# Patient Record
Sex: FEMALE | Race: WHITE
[De-identification: names, ages, dates, MRNs, and addresses within clinical notes are randomized per-mention and may not be internally consistent; named-entity substitution may affect disease eponyms.]

---

## 2017-07-09 ENCOUNTER — HOSPITAL ENCOUNTER (EMERGENCY)
Dept: HOSPITAL 17 - NEPD | Age: 72
Discharge: HOME | End: 2017-07-09
Payer: MEDICARE

## 2017-07-09 VITALS
RESPIRATION RATE: 17 BRPM | OXYGEN SATURATION: 98 % | HEART RATE: 79 BPM | TEMPERATURE: 98.3 F | SYSTOLIC BLOOD PRESSURE: 140 MMHG | DIASTOLIC BLOOD PRESSURE: 66 MMHG

## 2017-07-09 DIAGNOSIS — E11.40: ICD-10-CM

## 2017-07-09 DIAGNOSIS — J44.9: ICD-10-CM

## 2017-07-09 DIAGNOSIS — J45.909: ICD-10-CM

## 2017-07-09 DIAGNOSIS — K21.9: ICD-10-CM

## 2017-07-09 DIAGNOSIS — I25.2: ICD-10-CM

## 2017-07-09 DIAGNOSIS — I25.10: ICD-10-CM

## 2017-07-09 DIAGNOSIS — M19.90: ICD-10-CM

## 2017-07-09 DIAGNOSIS — M54.32: Primary | ICD-10-CM

## 2017-07-09 DIAGNOSIS — I10: ICD-10-CM

## 2017-07-09 PROCEDURE — 96372 THER/PROPH/DIAG INJ SC/IM: CPT

## 2017-07-09 PROCEDURE — 99284 EMERGENCY DEPT VISIT MOD MDM: CPT

## 2017-07-09 NOTE — PD
HPI


Chief Complaint:  Pain: Acute or Chronic


Time Seen by Provider:  12:51


Travel History


International Travel<30 days:  No


Contact w/Intl Traveler<30days:  No


Traveled to known affect area:  No





History of Present Illness


HPI


The patient was seen and examined in the presence of the nurse.  This patient 

complains of pain in the left buttock that radiates down the back of her leg to 

the level of the mid calf.  She's had sciatica for the last 7 years but usually 

doesn't radiate this far.  No injury.  The duration of increased radiation is 2 

days.  She does go to pain management and get epidural injections.  No 

alleviating factors.  No bowel bladder incontinence or retention.  Symptoms 

severity is moderate.





PFSH


Past Medical History


Hx Anticoagulant Therapy:  Yes


Arthritis:  Yes


Asthma:  Yes


Blood Disorders:  No


Anxiety:  Yes


Depression:  Yes


Heart Rhythm Problems:  No


Cancer:  No


Cardiac Catheterization:  Yes


Cardiovascular Problems:  Yes


High Cholesterol:  Yes


Chest Pain:  Yes


Congestive Heart Failure:  No


COPD:  Yes


Coronary Artery Disease:  Yes


Diabetes:  Yes


Diminished Hearing:  Yes (LEFT EAR [place])


Endocrine:  Yes


Gastrointestinal Disorders:  Yes (GERD)


GERD:  Yes


Glaucoma:  No


Genitourinary:  No


Hepatitis:  No


Hiatal Hernia:  No


Hypertension:  Yes


Immune Disorder:  No


Implanted Vascular Access Dvce:  Yes


Musculoskeletal:  Yes (ARTHRITIS, NECK/ BACK PAIN)


Neurologic:  Yes (NEUROPATHY LEGS)


Psychiatric:  Yes (ANXIETY)


Reproductive:  No


Respiratory:  Yes (asthma, copd, bronchitis)


Integumentary:  No


Immunizations Current:  Yes


Myocardial Infarction:  Yes


Seizures:  No


Sleep Apnea:  No


Thyroid Disease:  Yes (THYROIDECTOMY)


Menopausal:  Yes





Past Surgical History


Abdominal Surgery:  No


AICD:  Yes ([employer] [phone] MODEL #'S [id number], [id number])


Body Medical Devices:  CARDIAC STENTS


Cardiac Surgery:  Yes


Coronary Stent:  Yes (X 2)


Ear Surgery:  No


Endocrine Surgery:  Yes (THYROIDECTOMY)


Eye Surgery:  Yes (LEFT CATARACT EXTRACT.)


Gynecologic Surgery:  No


Joint Replacement:  Yes (BILAT KNEES)


Neurologic Surgery:  No


Oral Surgery:  Yes (DENTAL, TONSILLECTOMY)


Pacemaker:  Yes (PLACED MAY 2016)


Thoracic Surgery:  No


Tonsillectomy:  Yes


Other Surgery:  Yes (BILAT KNEE)





Social History


Alcohol Use:  No


Tobacco Use:  No (FORMER)


Substance Use:  No





Allergies-Medications


(Allergen,Severity, Reaction):  


Coded Allergies:  


     Fabric Softeners (Unverified  Allergy, Severe, HIVES, DYSPNEA, 7/9/17)


     Lidocaine (Verified  Allergy, Severe, 7/9/17)


 HIVES


     Shellfish (Verified  Allergy, Severe, "stop breathing", 7/9/17)


     Cipro (Verified  Allergy, Mild, 7/9/17)


 HIVES


     Penicillin (Verified  Allergy, Mild, Hives, 7/9/17)


 HAS HAD ROCEPHIN IN PAST


     Talwin (Verified  Allergy, Mild, 7/9/17)


 HALLUCINATIONS


     MRI PRECAUTION (Verified  Adverse Reaction, Severe, [employer] EVARA DEFIB 

- MRI [id number] KMD 7-12-16, 7/9/17)


 [employer] EVARA DEFIB


 MODEL# [id number]


 LEAD MODEL# [id number]


 


 IMPLANTED 5/16/16


Reported Meds & Prescriptions





Reported Meds & Active Scripts


Active


Nebulizer (Miscellaneous Medication)  Mis 1 Box INH AS DIRECTED


Atrovent Ud 0.02% (0.5 Mg/2.5 Ml) (Ipratropium Bromide) 0.5 Mg/2.5 Ml Nebu 0.5 

Mg NEB Q6HR NEB 30 Days


     use with albuterol


     


Resp: Albuterol  2.5 Mg/3 Ml Neb (Albuterol Sulfate) 2.5 Mg/3 Ml Nebu 2.5 Mg 

INH Q6H PRN 30 Days


     take with atrovent


Coumadin (Warfarin Sod) 5 Mg Tab 5 Mg PO TUTHSA@16 30 Days


Ventolin Hfa (Albuterol Sulfate) 18 Gm Aero 2 Puff INH Q4H PRN 31 Days


     * SHAKE WELL BEFORE USE *


Reported


Xanax 0.25 Mg (Alprazolam) Alprazolam 0.25 mg Tab 1 Tab PO BID


Lioresal (Baclofen) 20 Mg Tab 20 Mg PO TID


Norco 7.5-325 mg (Hydrocodone-Acetaminophen 7.5-325 mg) 1 Tab 1 Tab PO TID PRN


Glimepiride 2 Mg Tab 2 Mg PO DAILY


Vitamin D 400 (Cholecalciferol) 400 Mg Chw 400 Mg CHEW BID


Losartan Potassium 25 MG (Losartan Potassium) 25 Mg Tab 25 Mg PO DAILY


Glucophage 500 mg (Metformin HCl) 500 Mg Tab 500 Mg PO DAILY


Cyclobenzaprine Hcl (Cyclobenzaprine HCl) 10 Mg Tab 10 Mg PO TID


Colace 100 Mg Cap (Docusate Sodium) 100 Mg Cap 100 Mg PO BID


Calcium 600 (Calcium Carbonate)  Tab 1 Tab PO HS


Dulera 100 mcg/dose (Mometasone Furoate-Formoterol 100 mcg/dose) 100 mcg/

actuation Inh 2 Puff PO BID


Protonix (Pantoprazole Sodium) 40 Mg Tab 40 Mg PO BID


Levothyroxine 100 mcg (Levothyroxine Sodium) 100 Mcg Tab 100 Mcg PO DAILY


Lovastatin 40 Mg Tab 20 Mg PO HS


Plavix (Clopidogrel Bisulfate) 75 Mg Tab 75 Mg PO DAILY


Prozac (Fluoxetine HCl) 20 Mg Cap 20 Mg PO DAILY


Coreg 12.5 mg (Carvedilol) 12.5 Mg Tab 6.25 Mg PO BID


Bupropion Hcl Xl (Bupropion HCl) 150 Mg Tab 150 Mg PO DAILY


Norvasc (Amlodipine Besylate) 10 Mg Tab 10 Mg PO DAILY


Zyrtec (Cetirizine HCl) 10 Mg Tab 10 Mg PO HS








Review of Systems


General / Constitutional:  No: Fever


Eyes:  No: Visual changes


HENT:  No: Headaches


Cardiovascular:  No: Chest Pain or Discomfort


Respiratory:  No: Shortness of Breath


Gastrointestinal:  No: Abdominal Pain


Genitourinary:  No: Dysuria


Musculoskeletal:  Positive: Pain


Skin:  No Rash


Neurologic:  No: Weakness


Psychiatric:  No: Depression


Endocrine:  No: Polydipsia


Hematologic/Lymphatic:  No: Easy Bruising





Physical Exam


Narrative


GASTROINTESTINAL:  Abdomen soft, non-tender, nondistended. Positive bowel 

sounds.  No hepato-splenomegaly, or palpable masses. No guarding.





NEUROLOGICAL: Awake and alert. Pupils are equal round and reactive. Motor and 

sensory grossly within normal limits. Five out of 5 muscle strength in all 

muscle groups.  Normal speech.





Positive straight leg raise in the left





Left leg shows no erythema or swelling or warmth.





Has tenderness with palpation of left buttock





Data


Data


Last Documented VS





Vital Signs








  Date Time  Temp Pulse Resp B/P Pulse Ox O2 Delivery O2 Flow Rate FiO2


 


7/9/17 12:37 98.3 79 17 140/66 98   








Orders








 Morphine Inj (Morphine Inj) (7/9/17 13:15)


Ondansetron Inj (Zofran Inj) (7/9/17 13:15)











MDM


Medical Decision Making


Medical Screen Exam Complete:  Yes


Emergency Medical Condition:  Yes


Medical Record Reviewed:  Yes


Differential Diagnosis


Sciatica, disc herniation, hamstring tear


Narrative Course


I have reviewed the patient's electronic medical record.





Patient is neurologically intact.  She is having a flare of her chronic sciatica





I gave her injection of morphine and Zofran for symptom relief





She will follow-up with her primary care and pain management





Diagnosis





 Primary Impression:  


 Sciatic leg pain





***Additional Instructions:


The patient was advised to follow up with their physician and return if they 

worsen.


***Med/Other Pt SpecificInfo:  Other


Disposition:  01 DISCHARGE HOME


Condition:  Stable








Hector Foster MD Jul 9, 2017 12:59

## 2018-05-16 ENCOUNTER — HOSPITAL ENCOUNTER (EMERGENCY)
Dept: HOSPITAL 17 - PHEFT | Age: 73
Discharge: HOME | End: 2018-05-16
Payer: MEDICARE

## 2018-05-16 VITALS
OXYGEN SATURATION: 95 % | SYSTOLIC BLOOD PRESSURE: 169 MMHG | HEART RATE: 62 BPM | RESPIRATION RATE: 16 BRPM | DIASTOLIC BLOOD PRESSURE: 76 MMHG | TEMPERATURE: 98.4 F

## 2018-05-16 VITALS — HEIGHT: 61 IN | BODY MASS INDEX: 37.46 KG/M2 | WEIGHT: 198.42 LBS

## 2018-05-16 DIAGNOSIS — M77.8: ICD-10-CM
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
## 2018-05-16 DIAGNOSIS — F32.9: ICD-10-CM

## 2018-05-16 DIAGNOSIS — J44.9: ICD-10-CM

## 2018-05-16 DIAGNOSIS — E11.9: ICD-10-CM

## 2018-05-16 DIAGNOSIS — M19.90: ICD-10-CM

## 2018-05-16 DIAGNOSIS — S51.011A: Primary | ICD-10-CM

## 2018-05-16 DIAGNOSIS — F41.9: ICD-10-CM

## 2018-05-16 DIAGNOSIS — I10: ICD-10-CM

## 2018-05-16 PROCEDURE — 73080 X-RAY EXAM OF ELBOW: CPT

## 2018-05-16 PROCEDURE — 12002 RPR S/N/AX/GEN/TRNK2.6-7.5CM: CPT

## 2018-05-16 PROCEDURE — 96372 THER/PROPH/DIAG INJ SC/IM: CPT

## 2018-05-16 PROCEDURE — 99283 EMERGENCY DEPT VISIT LOW MDM: CPT

## 2018-05-16 NOTE — RADRPT
EXAM DATE/TIME:  05/16/2018 17:35 

 

[facility] COMPARISON:     

No previous studies available for comparison.

 

                     

INDICATIONS :     

Right elbow pain, laceration post fall today

                     

 

MEDICAL HISTORY :     

None.          

 

SURGICAL HISTORY :     

None.   

 

ENCOUNTER:     

Initial                                        

 

ACUITY:     

1 day      

 

PAIN SCORE:     

5/10

 

LOCATION:     

Right  posterior elbow

 

FINDINGS:     

Multiple view examination of the right elbow demonstrates no soft tissue swelling, joint effusion, or
 definite acute fracture.  There is a small bony density seen adjacent to the distal lateral aspect o
f the humerus. This is likely related to hypertrophic change. A donor site to suggest fracture is not
 seen. There is minimal spurring at the olecranon. The osseous structures are in normal alignment.  B
bharathi mineralization is normal.

 

CONCLUSION:     

1. No definite acute fracture or effusion is seen.

2. Small focal bony density likely related to hypertrophic change adjacent to the distal lateral aspe
ct of the humerus.

3. Small olecranon spur.

 

 

 

 

 Jimbo Reyes MD on May 16, 2018 at 18:11           

Board Certified Radiologist.

 This report was verified electronically.

## 2018-05-16 NOTE — PD
HPI


Chief Complaint:  Laceration/Skin Injury


Time Seen by Provider:  17:21


Travel History


International Travel<30 days:  No


Contact w/Intl Traveler<30days:  No


Traveled to known affect area:  No





History of Present Illness


HPI


72-year-old female that presents to the ED for evaluation of injury to her 

right elbow.  Patient reports that she accidentally lost her balance while 

walking into her garage door.  She cut herself on the garage door.  She did not 

hit her head or lose consciousness.  Denies any back or neck pain.  All her 

pain is in the right elbow.  She is able to move it fully.  Denies any numbness

, drooling, weakness.  No chest pain or shortness of breath.  No urinary or 

bowel movement issues.  Pain per patient is 6 out of 10 especially of the right 

elbow where she has a laceration of about 5 cm.  Able to move the elbow fully.  

No prior injuries to this area.  She does have multiple allergies to different 

medications including lidocaine.  She has not taken anything for this.  Injury 

occurred less than 2 hours ago.





PFSH


Past Medical History


Hx Anticoagulant Therapy:  Yes


Arthritis:  Yes


Asthma:  Yes


Blood Disorders:  No


Anxiety:  Yes


Depression:  Yes


Heart Rhythm Problems:  No


Cancer:  No


Cardiac Catheterization:  Yes


Cardiovascular Problems:  Yes


High Cholesterol:  Yes


Chest Pain:  Yes


Congestive Heart Failure:  No


COPD:  Yes


Coronary Artery Disease:  Yes


Diabetes:  Yes


Patient Takes Glucophage:  No


Diminished Hearing:  Yes (LEFT EAR [place])


Endocrine:  Yes


Gastrointestinal Disorders:  Yes (GERD)


GERD:  Yes


Glaucoma:  No


Genitourinary:  No


Hepatitis:  No


Hiatal Hernia:  No


Hypertension:  Yes


Immune Disorder:  No


Implanted Vascular Access Dvce:  Yes


Medical other:  Yes (HIGH CHOLESTEROL)


Musculoskeletal:  Yes (ARTHRITIS, NECK/ BACK PAIN)


Neurologic:  Yes (NEUROPATHY LEGS)


Psychiatric:  Yes (ANXIETY)


Reproductive:  No


Respiratory:  Yes (asthma, copd, bronchitis)


Integumentary:  No


Immunizations Current:  Yes


Myocardial Infarction:  Yes


Seizures:  No


Sleep Apnea:  No


Thyroid Disease:  Yes (THYROIDECTOMY)


Tetanus Vaccination:  < 5 Years


Influenza Vaccination:  Yes


Menopausal:  Yes





Past Surgical History


Abdominal Surgery:  No


AICD:  Yes ([employer] [phone] MODEL #'S [id number], [id number])


Body Medical Devices:  CARDIAC STENTS


Cardiac Surgery:  Yes


Coronary Stent:  Yes (X 3)


Ear Surgery:  No


Endocrine Surgery:  Yes (THYROIDECTOMY)


Eye Surgery:  Yes (CATARACT EXTRACT.)


Gynecologic Surgery:  No


Joint Replacement:  Yes (BILAT KNEES)


Neurologic Surgery:  No


Oral Surgery:  Yes (DENTAL, TONSILLECTOMY)


Pacemaker:  Yes (PLACED MAY 2016)


Thoracic Surgery:  No


Tonsillectomy:  Yes


Other Surgery:  Yes (BILAT KNEE)





Social History


Alcohol Use:  No


Tobacco Use:  No (FORMER)


Substance Use:  No





Allergies-Medications


(Allergen,Severity, Reaction):  


Coded Allergies:  


     lidocaine (Unverified  Allergy, Severe, 5/16/18)


 HIVES


     polydimethylsiloxanes (Unverified  Allergy, Severe, HIVES, DYSPNEA, 5/16/18

)


     shellfish derived (Unverified  Allergy, Severe, "stop breathing", 5/16/18)


     ciprofloxacin (Unverified  Allergy, Mild, 5/16/18)


 HIVES


     penicillin G (Unverified  Allergy, Mild, Hives, 5/16/18)


 HAS HAD ROCEPHIN IN PAST


     pentazocine (Unverified  Allergy, Mild, 5/16/18)


 HALLUCINATIONS


     MRI PRECAUTION (Verified  Adverse Reaction, Severe, [employer] EVARA DEFIB 

- MRI [id number] KMD 7-12-16, 5/16/18)


 [employer] EVARA DEFIB


 MODEL# [id number]


 LEAD MODEL# [id number]


 


 IMPLANTED 5/16/16


Reported Meds & Prescriptions





Reported Meds & Active Scripts


Active


Hydrocodone-Acetamin  mg (Hydrocodone/Acetaminophen) 10 Mg-325 Mg Tablet 

1 Tab PO Q6HR PRN


Bactrim DS (Sulfamethoxazole-Trimethoprim) 800-160 Mg Tab 1 Tab PO BID 7 Days


Reported


Furosemide 40 Mg Tab 40 Mg PO DAILY


Potassium Chloride ER (Potassium Chloride) 10 Meq Tab 10 Meq PO DAILY


Metoprolol Tartrate 100 Mg Tab 100 Mg PO BID


Toviaz ER (Fesoterodine Fumarate) 4 mg Karan 4 Mg PO DAILY


Xanax (Alprazolam) 0.25 Mg Tab 0.25 Mg PO BID PRN


Norco (Hydrocodone-Acetaminophen) 7.5-325 mg Tab 1 Tab PO TID PRN


Biotin 5,000 Mcg Tab.rapdis 5,000 Mcg PO DAILY


Vitamin B-6 (Pyridoxine HCl) 100 Mg Tab 100 Mg PO DAILY


Zinc (Zinc Gluconate) 50 Mg Tab 50 Mg PO DAILY


Aspirin Adult Low Strength (Aspirin) 81 Mg Tabdr 162 Mg PO HS


Zyrtec (Cetirizine HCl) 10 Mg Tablet 10 Mg PO HS


Protonix (Pantoprazole Sodium) 40 Mg Tab 40 Mg PO DAILY


Glimepiride 2 Mg Tab 2 Mg PO DAILY


     Take with breakfast or first main meal


Amlodipine (Amlodipine Besylate) 5 Mg Tab 5 Mg PO DAILY


Fluoxetine (Fluoxetine HCl) 20 Mg Tab 40 Mg PO DAILY


Levothyroxine (Levothyroxine Sodium) 100 Mcg Tab 100 Mcg PO DAILY


Lovastatin 20 Mg Tab 20 Mg PO HS


Bupropion HCl ER 24 HR (Bupropion HCl) 150 Mg Tab 150 Mg PO HS


Metformin (Metformin HCl) 500 Mg Tab 500 Mg PO DAILY


     With a meal








Review of Systems


Except as stated in HPI:  all other systems reviewed are Neg





Physical Exam


Narrative


GENERAL: 


SKIN: Warm and dry.  Patient has a 5 cm laceration to the right elbow.  Muscle 

showing.  No sign of foreign body, vessel, nerve, bone damage noted.  Bone is 

not exposed.  Patient has 3 small abrasions on the upper arm noted as well but 

no lacerations.


HEAD: Atraumatic. Normocephalic. 


EYES: Pupils equal and round. No scleral icterus. No injection or drainage. 


ENT: No nasal bleeding or discharge.  Mucous membranes pink and moist.


NECK: Trachea midline. No JVD. 


CARDIOVASCULAR: Regular rate and rhythm.  


RESPIRATORY: No accessory muscle use. Clear to auscultation. Breath sounds 

equal bilaterally. 


GASTROINTESTINAL: Abdomen soft, non-tender, nondistended. Hepatic and splenic 

margins not palpable. 


MUSCULOSKELETAL: Extremities without clubbing, cyanosis, or edema. No obvious 

deformities.  Full range of motion of the upper and lower extremities 

bilaterally.  No lumbar, thoracic, cervical spine tenderness to palpation.  2+ 

pulses bilaterally.  Sensation intact bilaterally.


NEUROLOGICAL: Awake and alert. No obvious cranial nerve deficits.  Motor 

grossly within normal limits. Five out of 5 muscle strength in the arms and 

legs.  Normal speech.


PSYCHIATRIC: Appropriate mood and affect; insight and judgment normal.





Data


Data


Last Documented VS





Vital Signs








  Date Time  Temp Pulse Resp B/P (MAP) Pulse Ox O2 Delivery O2 Flow Rate FiO2


 


5/16/18 17:15 98.4 62 16 169/76 (107) 95   








Orders





 Orders


Elbow, Complete (4 Vws) (5/16/18 )


Morphine Inj (Morphine Inj) (5/16/18 17:30)


Ondansetron  Odt (Zofran  Odt) (5/16/18 17:30)


Wound Care (5/16/18 17:28)


Morphine Inj (Morphine Inj) (5/16/18 17:45)








MDM


Medical Decision Making


Medical Screen Exam Complete:  Yes


Emergency Medical Condition:  Yes


Medical Record Reviewed:  Yes


Interpretation(s)


X-ray of the right elbow was negative.


Differential Diagnosis


Fracture versus sprain versus strain versus laceration


Narrative Course


72-year-old female that presents to the ED for evaluation of right elbow 

injury.  Patient was properly examined and was found to have signs and symptoms 

consistent appears to be right elbow injury with laceration.  After explained 

procedure to the patient instructed to her laceration was repaired stating 

procedure note.  Patient is allergic to lidocaine and she was given morphine to 

help with the pain.  She agreed for us to repair laceration with a lidocaine 

because of her severe allergy.  She was told to get staples removed in 2 weeks.

  X-ray was negative for acute disease.  Patient was reassured.  Patient given 

a prescription for Lortab and Bactrim.  Patient was told to watch for signs of 

infection.  This happens to come back to the ED.  Ice to the area.  See ED if 

worsening symptoms.  Follow-up with PCP.





Procedures


**Procedure Narrative**


LACERATION


LOCATION: right elbow


LENGTH: 5 cm


NUMBER OF STITCHES/STAPLES: 6 staples





REPAIR: The area of the laceration was prepped with Betadine and sterilely 

draped.   The wound was copiously irrigated and explored without evidence of 

foreign body, tendon injury or neurovascular injury.  The wound was closed 

using sterile stapler. This was a 1 layer repair. A sterile dressing was 

applied. The patient was advised to keep the dressing clean and dry. Patient 

tolerated the procedure well.





Diagnosis





 Primary Impression:  


 Laceration of elbow, right


 Qualified Codes:  S51.011A - Laceration without foreign body of right elbow, 

initial encounter


 Additional Impression:  


 Fall


 Qualified Codes:  W19.XXXA - Unspecified fall, initial encounter


Patient Instructions:  General Instructions, Narcotic given in the ED


Departure Forms:  Tests/Procedures





***Additional Instructions:  


Take medications as prescribed.


Follow-up with PCP.


See ED for any worsening symptoms.


Do not drink or drive while taking pain medication.


Apply ice or heat as needed for pain.


Get staples removed in 2 weeks from today.


***Med/Other Pt SpecificInfo:  Prescription(s) given, Wound Care


Scripts


Hydrocodone/Acetaminophen (Hydrocodone-Acetamin  mg) 10 Mg-325 Mg Tablet


1 TAB PO Q6HR Y for PAIN SCALE 1 TO 10, #14


   Prov: Tani Salgado MD         5/16/18 


Sulfamethoxazole-Trimethoprim (Bactrim DS) 800-160 Mg Tab


1 TAB PO BID for Infection for 7 Days, #14 TAB 0 Refills


   Prov: Tani Salgado MD         5/16/18


Disposition:  01 DISCHARGE HOME


Condition:  Lazarus Gee May 16, 2018 18:21